# Patient Record
Sex: MALE | Race: WHITE | HISPANIC OR LATINO | URBAN - METROPOLITAN AREA
[De-identification: names, ages, dates, MRNs, and addresses within clinical notes are randomized per-mention and may not be internally consistent; named-entity substitution may affect disease eponyms.]

---

## 2024-07-13 ENCOUNTER — WALK IN (OUTPATIENT)
Dept: URGENT CARE | Age: 29
End: 2024-07-13

## 2024-07-13 VITALS
HEART RATE: 63 BPM | TEMPERATURE: 96.7 F | SYSTOLIC BLOOD PRESSURE: 145 MMHG | OXYGEN SATURATION: 98 % | DIASTOLIC BLOOD PRESSURE: 90 MMHG | RESPIRATION RATE: 16 BRPM

## 2024-07-13 DIAGNOSIS — L29.9 PRURITUS: ICD-10-CM

## 2024-07-13 DIAGNOSIS — L24.7 IRRITANT CONTACT DERMATITIS DUE TO PLANTS, EXCEPT FOOD: Primary | ICD-10-CM

## 2024-07-13 RX ORDER — TRIAMCINOLONE ACETONIDE 1 MG/G
OINTMENT TOPICAL
Qty: 30 G | Refills: 0 | Status: SHIPPED | OUTPATIENT
Start: 2024-07-13 | End: 2024-07-23

## 2024-07-13 RX ORDER — PREDNISONE 20 MG/1
40 TABLET ORAL DAILY
Qty: 14 TABLET | Refills: 1 | Status: SHIPPED | OUTPATIENT
Start: 2024-07-13 | End: 2024-07-27

## 2024-08-22 ENCOUNTER — HOSPITAL ENCOUNTER (EMERGENCY)
Facility: HOSPITAL | Age: 29
Discharge: HOME | End: 2024-08-23
Attending: EMERGENCY MEDICINE

## 2024-08-22 ENCOUNTER — APPOINTMENT (OUTPATIENT)
Dept: CT IMAGING | Facility: HOSPITAL | Age: 29
End: 2024-08-22
Attending: EMERGENCY MEDICINE

## 2024-08-22 ENCOUNTER — APPOINTMENT (OUTPATIENT)
Dept: GENERAL RADIOLOGY | Facility: HOSPITAL | Age: 29
End: 2024-08-22

## 2024-08-22 DIAGNOSIS — S82.142A CLOSED FRACTURE OF LEFT TIBIAL PLATEAU, INITIAL ENCOUNTER: Primary | ICD-10-CM

## 2024-08-22 PROCEDURE — 73700 CT LOWER EXTREMITY W/O DYE: CPT | Performed by: EMERGENCY MEDICINE

## 2024-08-22 PROCEDURE — 99285 EMERGENCY DEPT VISIT HI MDM: CPT

## 2024-08-22 PROCEDURE — 76377 3D RENDER W/INTRP POSTPROCES: CPT | Performed by: EMERGENCY MEDICINE

## 2024-08-22 PROCEDURE — 73560 X-RAY EXAM OF KNEE 1 OR 2: CPT

## 2024-08-22 PROCEDURE — 96374 THER/PROPH/DIAG INJ IV PUSH: CPT

## 2024-08-22 RX ORDER — MORPHINE SULFATE 4 MG/ML
4 INJECTION, SOLUTION INTRAMUSCULAR; INTRAVENOUS ONCE
Status: COMPLETED | OUTPATIENT
Start: 2024-08-22 | End: 2024-08-22

## 2024-08-23 ENCOUNTER — TELEPHONE (OUTPATIENT)
Dept: ORTHOPEDICS CLINIC | Facility: CLINIC | Age: 29
End: 2024-08-23

## 2024-08-23 VITALS
RESPIRATION RATE: 18 BRPM | SYSTOLIC BLOOD PRESSURE: 125 MMHG | HEART RATE: 76 BPM | BODY MASS INDEX: 30.78 KG/M2 | TEMPERATURE: 99 F | WEIGHT: 215 LBS | HEIGHT: 70 IN | OXYGEN SATURATION: 97 % | DIASTOLIC BLOOD PRESSURE: 72 MMHG

## 2024-08-23 RX ORDER — HYDROCODONE BITARTRATE AND ACETAMINOPHEN 5; 325 MG/1; MG/1
1 TABLET ORAL ONCE
Status: COMPLETED | OUTPATIENT
Start: 2024-08-23 | End: 2024-08-23

## 2024-08-23 RX ORDER — HYDROCODONE BITARTRATE AND ACETAMINOPHEN 5; 325 MG/1; MG/1
1-2 TABLET ORAL EVERY 6 HOURS PRN
Qty: 20 TABLET | Refills: 0 | Status: SHIPPED | OUTPATIENT
Start: 2024-08-23 | End: 2024-08-30

## 2024-08-23 NOTE — TELEPHONE ENCOUNTER
Can you see this patient? When?    DOI:08/22/24    CT  CONCLUSION:  Comminuted impacted fracture of the lateral tibial plateau is noted.  There is associated fat fluid level joint effusion.     XR  IMPRESSION:   Irregularity in the lateral tibial plateau may be sequelae of fracture.  However, this is of unknown chronicity.  A follow-up MRI examination without contrast may be done for further evaluation.

## 2024-08-23 NOTE — ED PROVIDER NOTES
Patient Seen in: [facility] Emergency Department      History     Chief Complaint   Patient presents with    Syncope    Knee Pain     Stated Complaint: near synopal episode at 8 pm, fell onto right knee, now c/o right knee pain, kn*    Subjective:   HPI    21-year-old male presenting emerged part for knee injury.  Patient fell onto his right knee prior to his arrival in the emergency department when he tripped.  He is having pains and at that time cannot walk on it denies any other exacerbating leaving factors or associated symptoms    Objective:   History reviewed. No pertinent past medical history.           History reviewed. No pertinent surgical history.             Social History     Socioeconomic History    Marital status: Single   Tobacco Use    Smoking status: Every Day     Current packs/day: 1.00     Average packs/day: 1 pack/day for 12.0 years (12.0 ttl pk-yrs)     Types: Cigarettes    Smokeless tobacco: Never   Vaping Use    Vaping status: Never Used   Substance and Sexual Activity    Alcohol use: Yes     Comment: 2 days in aweek    Drug use: Never              Review of Systems    Positive for stated Chief Complaint: Syncope and Knee Pain    Other systems are as noted in HPI.  Constitutional and vital signs reviewed.      All other systems reviewed and negative except as noted above.    Physical Exam     ED Triage Vitals [08/22/24 2026]   /73   Pulse 98   Resp 18   Temp 98.7 °F (37.1 °C)   Temp src Temporal   SpO2 97 %   O2 Device None (Room air)       Current Vitals:   Vital Signs  BP: 118/73  Pulse: 98  Resp: 18  Temp: 98.7 °F (37.1 °C)  Temp src: Temporal    Oxygen Therapy  SpO2: 97 %  O2 Device: None (Room air)            Physical Exam  Awake alert patient appears uncomfortable HEENT exam normal lungs normal cardiovascular exam normal abdomen normal extremities normal except for left lower extremity significant joint effusion pain patient does not want move his left knee neurovascular  tact distally of injury Murry test normal no pain or compression squeeze test there was patellar apprehension noted no erythema no breaks in the skin       ED Course   Labs Reviewed - No data to display          Differential diagnosis includes fracture, dislocation         MDM                                         Medical Decision Making  49-year-old male presenting to the [facility] part for left knee injury.  Independent interpretation by ED physician of x-rays show left tibial plateau fracture independent interpretation by ED physician CT scan shows tibial plateau fracture patient was discussed with orthopedics arrange close follow-up to place Norco for breakthrough pain he is placed in knee immobilizer crutches to be nonweightbearing he is to return [facility] part worsening symptoms other complaints  The patient was screened and evaluated during this visit.  As a treating physician attending to the patient, I determined, within reasonable clinical confidence and prior to discharge, that an emergency medical condition was not or was no longer present.  There was no indication for further evaluation, treatment or admission on an emergency basis.    The usual and customary discharge instructions were discussed given the patient's ER course.  We discussed signs and symptoms that should prompt the patient's immediate return to the emergency department.  Reasonable over-the-counter and prescription treatment options and physician follow-up plan was discussed.  Patient was discharged home in good condition  This note was prepared using Dragon Medical voice recognition dictation software.  As a result errors may occur.  When identified to these areas have been corrected.  While every attempt is made to correct errors during dictation discrepancies may still exist.  Please contact if there are any errors    Problems Addressed:  Closed fracture of left tibial plateau, initial encounter: acute illness or injury    Amount  and/or Complexity of Data Reviewed  Radiology: ordered and independent interpretation performed. Decision-making details documented in ED Course.  ECG/medicine tests: ordered and independent interpretation performed. Decision-making details documented in ED Course.        Disposition and Plan     Clinical Impression:  1. Closed fracture of left tibial plateau, initial encounter         Disposition:  Discharge  8/23/2024 12:06 am    Follow-up:  Martínez Armendariz MD  [address] [place] [id number] [phone]    Follow up in 1 week(s)            Medications Prescribed:  Current Discharge Medication List        START taking these medications    Details   HYDROcodone-acetaminophen 5-325 MG Oral Tab Take 1-2 tablets by mouth every 6 (six) hours as needed.  Qty: 20 tablet, Refills: 0    Associated Diagnoses: Closed fracture of left tibial plateau, initial encounter

## 2024-08-23 NOTE — TELEPHONE ENCOUNTER
Per Bill Patient called for right knee tibial plateau fx. Please advise if/when patient can be fit

## 2024-08-23 NOTE — ED INITIAL ASSESSMENT (HPI)
Brought by medic  with  history of tripped and fell down . Patient said he fell on his left knee . Now complaining of pain and swelling in left knee unable to weight bear. Received fentanyl 100mcg from EMS. Patient has PIV  in place

## 2024-08-26 NOTE — TELEPHONE ENCOUNTER
Tried calling patient at number provided~ 2x- rang and went to a fast busy. Please try and assist patient by directing him to the MDs below- Thanks!    [facility]~    [phone]    [place] - Dr. Petrona Pereira, Dr. John Noel or Dr. Igor Romero

## (undated) NOTE — LETTER
Date & Time: 8/23/2024, 12:20 AM  Patient: Bryant Odom  Encounter Provider(s):    Gianni Wall MD       To Whom It May Concern:    Bryant Odom was seen and treated in our department on 8/22/2024. He should not return to work until cleared by orthopedics .    If you have any questions or concerns, please do not hesitate to call.        _____________________________  Physician/[facility] Signature